# Patient Record
Sex: MALE | Race: WHITE | NOT HISPANIC OR LATINO | Employment: OTHER | URBAN - METROPOLITAN AREA
[De-identification: names, ages, dates, MRNs, and addresses within clinical notes are randomized per-mention and may not be internally consistent; named-entity substitution may affect disease eponyms.]

---

## 2021-05-29 ENCOUNTER — RECORDS - HEALTHEAST (OUTPATIENT)
Dept: ADMINISTRATIVE | Facility: CLINIC | Age: 71
End: 2021-05-29

## 2024-08-30 ENCOUNTER — HOSPITAL ENCOUNTER (EMERGENCY)
Facility: HOSPITAL | Age: 74
Discharge: HOME | End: 2024-08-30
Admitting: PHYSICIAN ASSISTANT
Payer: COMMERCIAL

## 2024-08-30 VITALS
HEART RATE: 78 BPM | DIASTOLIC BLOOD PRESSURE: 72 MMHG | RESPIRATION RATE: 16 BRPM | BODY MASS INDEX: 30.48 KG/M2 | OXYGEN SATURATION: 97 % | SYSTOLIC BLOOD PRESSURE: 118 MMHG | HEIGHT: 73 IN | TEMPERATURE: 99.4 F | WEIGHT: 230 LBS

## 2024-08-30 DIAGNOSIS — M79.10 MYALGIA: ICD-10-CM

## 2024-08-30 DIAGNOSIS — R05.9 COUGH: ICD-10-CM

## 2024-08-30 DIAGNOSIS — U07.1 COVID-19: Primary | ICD-10-CM

## 2024-08-30 PROBLEM — M10.9 GOUT: Status: ACTIVE | Noted: 2024-08-30

## 2024-08-30 PROBLEM — N52.9 IMPOTENCE OF ORGANIC ORIGIN: Status: ACTIVE | Noted: 2024-08-30

## 2024-08-30 PROBLEM — Z86.0101 HISTORY OF ADENOMATOUS POLYP OF COLON: Status: ACTIVE | Noted: 2024-08-30

## 2024-08-30 PROBLEM — E11.9 TYPE 2 DIABETES MELLITUS WITHOUT COMPLICATIONS (H): Status: ACTIVE | Noted: 2024-08-30

## 2024-08-30 PROBLEM — Z77.29 EXPOSURE TO POTENTIALLY HAZARDOUS SUBSTANCE: Status: ACTIVE | Noted: 2024-03-07

## 2024-08-30 PROBLEM — M54.9 CHRONIC BACK PAIN: Status: ACTIVE | Noted: 2024-08-30

## 2024-08-30 PROBLEM — L98.9 DISORDER OF SKIN OR SUBCUTANEOUS TISSUE: Status: ACTIVE | Noted: 2024-08-30

## 2024-08-30 PROBLEM — M47.896 OTHER SPONDYLOSIS, LUMBAR REGION: Status: ACTIVE | Noted: 2024-08-30

## 2024-08-30 PROBLEM — H61.23 IMPACTED CERUMEN, BILATERAL: Status: ACTIVE | Noted: 2024-08-30

## 2024-08-30 PROBLEM — R04.0 EPISTAXIS: Status: ACTIVE | Noted: 2024-08-30

## 2024-08-30 PROBLEM — E11.9 DIABETES MELLITUS (H): Status: ACTIVE | Noted: 2024-08-30

## 2024-08-30 PROBLEM — G47.00 INSOMNIA: Status: ACTIVE | Noted: 2024-08-30

## 2024-08-30 PROBLEM — K03.6 DEPOSITS (ACCRETIONS) ON TEETH: Status: ACTIVE | Noted: 2024-08-30

## 2024-08-30 PROBLEM — I10 HYPERTENSION: Status: ACTIVE | Noted: 2024-08-30

## 2024-08-30 PROBLEM — I35.0 AORTIC VALVE STENOSIS: Status: ACTIVE | Noted: 2024-08-30

## 2024-08-30 PROBLEM — M25.569 PAIN IN JOINT, LOWER LEG: Status: ACTIVE | Noted: 2024-08-30

## 2024-08-30 PROBLEM — M47.816 LUMBAR SPONDYLOSIS: Status: ACTIVE | Noted: 2024-08-30

## 2024-08-30 PROBLEM — G89.29 CHRONIC BACK PAIN: Status: ACTIVE | Noted: 2024-08-30

## 2024-08-30 PROBLEM — K02.7 DENTAL ROOT CARIES: Status: ACTIVE | Noted: 2024-08-30

## 2024-08-30 PROBLEM — K08.424: Status: ACTIVE | Noted: 2024-08-30

## 2024-08-30 PROBLEM — B19.9 VIRAL HEPATITIS WITHOUT HEPATIC COMA: Status: ACTIVE | Noted: 2024-08-30

## 2024-08-30 PROBLEM — R06.00 DYSPNEA, UNSPECIFIED: Status: ACTIVE | Noted: 2024-08-30

## 2024-08-30 LAB
ANION GAP SERPL CALCULATED.3IONS-SCNC: 12 MMOL/L (ref 7–15)
BUN SERPL-MCNC: 16.7 MG/DL (ref 8–23)
CALCIUM SERPL-MCNC: 8.9 MG/DL (ref 8.8–10.4)
CHLORIDE SERPL-SCNC: 98 MMOL/L (ref 98–107)
CREAT SERPL-MCNC: 1.03 MG/DL (ref 0.67–1.17)
EGFRCR SERPLBLD CKD-EPI 2021: 75 ML/MIN/1.73M2
GLUCOSE SERPL-MCNC: 119 MG/DL (ref 70–99)
HCO3 SERPL-SCNC: 27 MMOL/L (ref 22–29)
POTASSIUM SERPL-SCNC: 4.4 MMOL/L (ref 3.4–5.3)
SODIUM SERPL-SCNC: 137 MMOL/L (ref 135–145)

## 2024-08-30 PROCEDURE — 36415 COLL VENOUS BLD VENIPUNCTURE: CPT | Performed by: PHYSICIAN ASSISTANT

## 2024-08-30 PROCEDURE — 80048 BASIC METABOLIC PNL TOTAL CA: CPT | Performed by: PHYSICIAN ASSISTANT

## 2024-08-30 PROCEDURE — 99283 EMERGENCY DEPT VISIT LOW MDM: CPT

## 2024-08-30 ASSESSMENT — ACTIVITIES OF DAILY LIVING (ADL)
ADLS_ACUITY_SCORE: 35
ADLS_ACUITY_SCORE: 33

## 2024-08-30 ASSESSMENT — COLUMBIA-SUICIDE SEVERITY RATING SCALE - C-SSRS
6. HAVE YOU EVER DONE ANYTHING, STARTED TO DO ANYTHING, OR PREPARED TO DO ANYTHING TO END YOUR LIFE?: NO
1. IN THE PAST MONTH, HAVE YOU WISHED YOU WERE DEAD OR WISHED YOU COULD GO TO SLEEP AND NOT WAKE UP?: NO
2. HAVE YOU ACTUALLY HAD ANY THOUGHTS OF KILLING YOURSELF IN THE PAST MONTH?: NO

## 2024-08-30 NOTE — ED TRIAGE NOTES
Pt states that he has been ill with cough, body aches, shortness of breath, and headaches for the last few days. Pt tested positive for COVID today. [facility] and [facility] told [facility] to be checked out in the ER today due to the body aches.      Triage Assessment (Adult)       Row Name 08/30/24 1258          Triage Assessment    Airway WDL WDL        Respiratory WDL    Respiratory WDL cough;rhythm/pattern     Rhythm/Pattern, Respiratory shortness of breath;unlabored     Cough Frequency frequent     Cough Type productive        Skin Circulation/Temperature WDL    Skin Circulation/Temperature WDL WDL        Cardiac WDL    Cardiac WDL WDL        Peripheral/Neurovascular WDL    Peripheral Neurovascular WDL WDL        Cognitive/Neuro/Behavioral WDL    Cognitive/Neuro/Behavioral WDL WDL

## 2024-08-30 NOTE — ED PROVIDER NOTES
EMERGENCY DEPARTMENT ENCOUNTER      NAME: Darin Bergman  AGE: 74 year old male  YOB: 1950  MRN: [id number]  EVALUATION DATE & TIME: 2024  2:00 PM    PCP: [facility]    ED PROVIDER: Fidelina Nagel PA-C      Chief Complaint   Patient presents with    Covid Concern         FINAL IMPRESSION:  1. COVID-19    2. Myalgia    3. Cough          ED COURSE & MEDICAL DECISION MAKIN:58 PM I introduced myself to patient, performed initial HPI and examination. Consulted with pharmacy regarding paxlovid candidacy.   2:58 PM Consulted with pharmacy regarding paxlovid candidacy.   4:36 PM BMP resulted, will discharge with paxlovid     74 year old male with PMH chronic back pain, type 2 diabetes, HTN, viral hepatitis presents to the Emergency Department for evaluation of COVID. Reports symptoms started 3 days ago. Positive COVID test at home today. Here for paxlovid.    Reports productive cough. VSS, afebrile without hypoxia.  Exam with well appearing male, no acute distress. Clear lungs.  Considered CXR/CT chest but with clear lungs, reassuring vitals and + covid testing at home this was deferred. Low suspicion for bacterial pneumonia. Not consistent with ACS, no indication for EKG/additional labs.     Presentation most consistent with viral syndrome, COVID with positive test at home. Consulted with pharmacy who gives the below recommendations:   AMLODIPINE 5 mg: Reduce 2.5 mg daily for next 8 days  PERCOCET 2 tablets q8h PRN: Reduce to 5 mg q8h PRN and monitor closely for increased side effects  LIPITOR 20 mg: HOLD for 8 days  TAMSULOSIN 0.4 mg BID: HOLD for 8 days    Instructions and prescription for paxlovid provided. Instructed on at home management, red flags/indications to return to the emergency department. All questions were answered to the best of my ability and patient is agreeable with plan.      Medical Decision Making    History:  Supplemental history from: Documented in  chart  External Record(s) reviewed: Documented in chart    Work Up:  Chart documentation includes differential considered and any EKGs or imaging independently interpreted by provider.  In additional to work up documented, I considered the following work up: Documented in chart, if applicable.    External consultation:  Discussion of management with another provider: Documented in chart, if applicable    Complicating factors:  Care impacted by chronic illness: Other: See above  Care affected by social determinants of health: N/A    Disposition considerations: Discharge. I prescribed additional prescription strength medication(s) as charted. See documentation for any additional details.      MEDICATIONS GIVEN IN THE EMERGENCY:  Medications - No data to display    NEW PRESCRIPTIONS STARTED AT TODAY'S ER VISIT  New Prescriptions    NIRMATRELVIR AND RITONAVIR (PAXLOVID) 300 MG/100 MG THERAPY PACK    Take 3 tablets by mouth 2 times daily for 5 days.          =================================================================    HPI    Patient information was obtained from: Patient    Use of : N/A         Darin Bergman is a 74 year old male with a pertinent history of chronic back pain, type 2 diabetes, HTN, viral hepatitis who presents to this ED by private car for evaluation of myalgias, productive cough, shortness of breath. Patient's wife tested positive for COVID last week,improving. Patient developed symptoms x 3 days ago, positive test today. Presents today for paxlovid treatment.    On Metformin for diabetes. Atorvastatin for cholesterol. No blood thinners. Does intermittently take prednisone, not taking any currently.    Does have a history of lung disease, questions COPD. Prior smoking history (quit many years ago). Uses albuterol inhaler as needed, has not needed it this past week.       REVIEW OF SYSTEMS   ROS negative unless otherwise stated in HPI    PAST MEDICAL HISTORY:  No past medical history  "on file.    PAST SURGICAL HISTORY:  No past surgical history on file.    CURRENT MEDICATIONS:    nirmatrelvir and ritonavir (PAXLOVID) 300 mg/100 mg therapy pack        ALLERGIES:  No Known Allergies    FAMILY HISTORY:  No family history on file.    SOCIAL HISTORY:   Social History     Socioeconomic History    Marital status:        VITALS:  /68   Pulse 80   Temp 99.4  F (37.4  C) (Oral)   Resp 21   Ht 1.854 m (6' 1\")   Wt 104.3 kg (230 lb)   SpO2 96%   BMI 30.34 kg/m      PHYSICAL EXAM    Constitutional: Well developed, Well nourished, NAD, GCS 15   HENT: Normocephalic, Atraumatic, Oropharynx clear.   Neck- Supple, Nontender. Normal ROM. No stridor.  Eyes: Conjunctiva normal.   Respiratory: No respiratory distress, speaking in full sentences. Normal breath sounds. No wheezing.   Cardiovascular: Normal heart rate, Regular rhythm, No murmurs.   GI: Soft, nontender  Musculoskeletal: No deformities, Moves all extremities equally. No calf tenderness or swelling.  Integument: Warm, Dry, No erythema, ecchymosis, or rash.  Neurologic: Alert & oriented x 3, Normal sensory function. No focal deficits.   Psychiatric: Affect normal, Judgment normal, Mood normal. Cooperative.     LAB:  All pertinent labs reviewed and interpreted.  Results for orders placed or performed during the hospital encounter of 08/30/24   Basic metabolic panel   Result Value Ref Range    Sodium 137 135 - 145 mmol/L    Potassium 4.4 3.4 - 5.3 mmol/L    Chloride 98 98 - 107 mmol/L    Carbon Dioxide (CO2) 27 22 - 29 mmol/L    Anion Gap 12 7 - 15 mmol/L    Urea Nitrogen 16.7 8.0 - 23.0 mg/dL    Creatinine 1.03 0.67 - 1.17 mg/dL    GFR Estimate 75 >60 mL/min/1.73m2    Calcium 8.9 8.8 - 10.4 mg/dL    Glucose 119 (H) 70 - 99 mg/dL       RADIOLOGY:  Reviewed all pertinent imaging. Please see official radiology report.  No orders to display       EKG:    None    PROCEDURES:   none        Fidelina Nagel PA-C  Emergency Medicine  [facility] " [facility] EMERGENCY DEPARTMENT  [address] [place] [id number]  [phone]             Fidelina Nagel PA-C  08/30/24 4564

## 2024-08-30 NOTE — DISCHARGE INSTRUCTIONS
AMLODIPINE 5 mg: Reduce 2.5 mg daily for next 8 days  PERCOCET 2 tablets q8h PRN: Reduce to 5 mg q8h PRN and monitor closely for increased side effects  LIPITOR 20 mg: HOLD for 8 days  TAMSULOSIN 0.4 mg BID: HOLD for 8 days    Take paxlovid as prescribed  Make sure you are drinking fluids to stay hydrated  Use your inhaler as needed    Return to the emergency department if you develop any new/worsening symptoms. We would be happy to see you.

## 2024-08-30 NOTE — ED NOTES
"Pt reports body aches , cough x 3 days. His wife tested covid positive last week. He has not been taking any OTC for pain. Requesting RX fir paxlovid. He did call [facility] but was told staff \" everyone's out of town and unable to give rx\"  "

## 2024-09-13 ENCOUNTER — HOSPITAL ENCOUNTER (EMERGENCY)
Facility: HOSPITAL | Age: 74
Discharge: HOME | End: 2024-09-13
Attending: STUDENT IN AN ORGANIZED HEALTH CARE EDUCATION/TRAINING PROGRAM | Admitting: STUDENT IN AN ORGANIZED HEALTH CARE EDUCATION/TRAINING PROGRAM
Payer: MEDICARE

## 2024-09-13 VITALS
BODY MASS INDEX: 29.52 KG/M2 | OXYGEN SATURATION: 92 % | TEMPERATURE: 97.9 F | RESPIRATION RATE: 22 BRPM | DIASTOLIC BLOOD PRESSURE: 60 MMHG | SYSTOLIC BLOOD PRESSURE: 127 MMHG | HEIGHT: 74 IN | HEART RATE: 80 BPM | WEIGHT: 230 LBS

## 2024-09-13 DIAGNOSIS — T78.40XA ALLERGIC REACTION, INITIAL ENCOUNTER: ICD-10-CM

## 2024-09-13 PROCEDURE — 96372 THER/PROPH/DIAG INJ SC/IM: CPT | Performed by: STUDENT IN AN ORGANIZED HEALTH CARE EDUCATION/TRAINING PROGRAM

## 2024-09-13 PROCEDURE — 96374 THER/PROPH/DIAG INJ IV PUSH: CPT

## 2024-09-13 PROCEDURE — 96375 TX/PRO/DX INJ NEW DRUG ADDON: CPT

## 2024-09-13 PROCEDURE — 250N000011 HC RX IP 250 OP 636: Performed by: STUDENT IN AN ORGANIZED HEALTH CARE EDUCATION/TRAINING PROGRAM

## 2024-09-13 PROCEDURE — 99285 EMERGENCY DEPT VISIT HI MDM: CPT | Mod: 25

## 2024-09-13 RX ORDER — METHYLPREDNISOLONE SODIUM SUCCINATE 125 MG/2ML
125 INJECTION, POWDER, LYOPHILIZED, FOR SOLUTION INTRAMUSCULAR; INTRAVENOUS ONCE
Status: COMPLETED | OUTPATIENT
Start: 2024-09-13 | End: 2024-09-13

## 2024-09-13 RX ORDER — EPINEPHRINE 1 MG/ML
0.3 INJECTION, SOLUTION INTRAMUSCULAR; SUBCUTANEOUS ONCE
Status: COMPLETED | OUTPATIENT
Start: 2024-09-13 | End: 2024-09-13

## 2024-09-13 RX ORDER — EPINEPHRINE 0.3 MG/.3ML
0.3 INJECTION SUBCUTANEOUS
Qty: 2 EACH | Refills: 0 | Status: SHIPPED | OUTPATIENT
Start: 2024-09-13

## 2024-09-13 RX ORDER — DIPHENHYDRAMINE HYDROCHLORIDE 50 MG/ML
25 INJECTION INTRAMUSCULAR; INTRAVENOUS ONCE
Status: COMPLETED | OUTPATIENT
Start: 2024-09-13 | End: 2024-09-13

## 2024-09-13 RX ORDER — LIDOCAINE 40 MG/G
CREAM TOPICAL
Status: DISCONTINUED | OUTPATIENT
Start: 2024-09-13 | End: 2024-09-13 | Stop reason: HOSPADM

## 2024-09-13 RX ADMIN — FAMOTIDINE 20 MG: 10 INJECTION, SOLUTION INTRAVENOUS at 13:19

## 2024-09-13 RX ADMIN — DIPHENHYDRAMINE HYDROCHLORIDE 25 MG: 50 INJECTION INTRAMUSCULAR; INTRAVENOUS at 13:15

## 2024-09-13 RX ADMIN — METHYLPREDNISOLONE SODIUM SUCCINATE 125 MG: 125 INJECTION, POWDER, FOR SOLUTION INTRAMUSCULAR; INTRAVENOUS at 13:17

## 2024-09-13 RX ADMIN — EPINEPHRINE 0.3 MG: 1 INJECTION INTRAMUSCULAR; INTRAVENOUS; SUBCUTANEOUS at 13:12

## 2024-09-13 ASSESSMENT — ACTIVITIES OF DAILY LIVING (ADL)
ADLS_ACUITY_SCORE: 35
ADLS_ACUITY_SCORE: 33
ADLS_ACUITY_SCORE: 35

## 2024-09-13 NOTE — ED TRIAGE NOTES
Pt got a bee sting on neck arms and legs today aprox 1 hr ago. Took 2 benadryl post stings. Pt reports that he went to ED aprox 3 years ago due to same reaction. That time his face and throat swelled. Able to maintain airway. No sob. No visible hives. Wife at bedside.

## 2024-09-13 NOTE — DISCHARGE INSTRUCTIONS
Please take benadryl or claritin at home for the next couple days.  Use the epipen if you get bit bees again.  Return to the ER if you ever use the epipen

## 2024-09-13 NOTE — ED PROVIDER NOTES
"EMERGENCY DEPARTMENT ENCOUNTER      NAME: Darin Bergman  AGE: 74 year old male  YOB: 1950  MRN: [id number]  EVALUATION DATE & TIME: 9/13/2024  1:02 PM    PCP: [facility]    ED PROVIDER: Orion Avendaño M.D.      Chief Complaint   Patient presents with    Allergic Reaction         FINAL IMPRESSION:  1. Allergic reaction, initial encounter          ED COURSE & MEDICAL DECISION MAKING:    Pertinent Labs & Imaging studies reviewed. (See chart for details)  74 year old male presents to the Emergency Department for evaluation of allergic reaction.    1:08 PM I met with the patient and conducted the initial exam and interview.  He is having a reaction to bee stings.  He relays preveious severe reaction and for that reasonwill proceed with IV antihistamines, steroids and IM epi.    2:48 PM I revisited and updated patient. Patient said he was feeling good but still itchy. We discussed discharge instructions, ointments for itching, and Epipen prescription and usage.    At the conclusion of the encounter I discussed the results of all of the tests and the discharge. The questions were answered. The patient or family acknowledged understanding and was agreeable with the care plan.              Medical Decision Making    Patient with history of severe swelling to bee sting.  He feels his throat is \"closing\".  Looks good on exam.  No posterior swelling.  Vitals reassurring.  Treated with IV meds and epipen because of the history and fact that they said it was \"delayed\" last time.  Patient did well with medications.  Was observed in the ER for 2.5 hours and is feeling better.  Will discharge with insturctions on epipen use and a rx for epipen.  Considered admission for observation but patient improved signficantly and so will discharge.    Obtained supplemental history:Supplemental history obtained?: Family Member/Significant Other  Reviewed external records: External records reviewed?: No  Care impacted " by chronic illness:Diabetes and Hypertension  Care significantly affected by social determinants of health:N/A  Did you consider but not order tests?: Work up considered but not performed and documented in chart, if applicable  Did you interpret images independently?: Independent interpretation of ECG and images noted in documentation, when applicable.  Consultation discussion with other provider:Did you involve another provider (consultant, , pharmacy, etc.)?: No  Discharge. I prescribed additional prescription strength medication(s) as charted. See documentation for any additional details.  Not Applicable          This patient involved a high degree of complexity in medical decision making, as significant risks were present and assessed. Recent encounters & results in medical record reviewed by me.     All workup (i.e. any EKG/labs/imaging as per charting below) reviewed and independently interpreted by me. See respective sections for details.      30 minutes of critical care time     MEDICATIONS GIVEN IN THE EMERGENCY:  Medications   EPINEPHrine (Anaphylaxis) (ADRENALIN) injection (vial) 0.3 mg (0.3 mg Intramuscular $Given 9/13/24 1312)   famotidine (PEPCID) injection 20 mg (20 mg Intravenous $Given 9/13/24 1319)   methylPREDNISolone Na Suc (solu-MEDROL) injection 125 mg (125 mg Intravenous $Given 9/13/24 1317)   diphenhydrAMINE (BENADRYL) injection 25 mg (25 mg Intravenous $Given 9/13/24 1315)       NEW PRESCRIPTIONS STARTED AT TODAY'S ER VISIT  Discharge Medication List as of 9/13/2024  3:16 PM        START taking these medications    Details   EPINEPHrine (ANY BX GENERIC EQUIV) 0.3 MG/0.3ML injection 2-pack Inject 0.3 mLs (0.3 mg) into the muscle once as needed for anaphylaxis. May repeat one time in 5-15 minutes if response to initial dose is inadequate., Disp-2 each, R-0, Local Print                =================================================================    HPI    Patient information was obtained  "from: patient and wife    Use of : N/A        Darin Bergman is a 74 year old male with a pertinent history of type 2 diabetes and hypertension who presents for evaluation of allergic reaction.    About one hour ago, patient received multiple bee stings by multiple bees on his legs. Patient took 2 benadryl immediately after. He had itchiness and neck tightness that felt like his throat was \"closing up\". The itchiness has decreased and the throat is slightly better.     About 3 years ago, patient had a bee sting and about 3 hours after, he had swelling all over his body and had to come into the ED. Patient denies history of heart problems.      REVIEW OF SYSTEMS   Review of Systems   Constitutional:         Positive for neck tightness   Neurological:         Positive for itchiness        PAST MEDICAL HISTORY:  No past medical history on file.    PAST SURGICAL HISTORY:  No past surgical history on file.        CURRENT MEDICATIONS:    EPINEPHrine (ANY BX GENERIC EQUIV) 0.3 MG/0.3ML injection 2-pack        ALLERGIES:  No Known Allergies    FAMILY HISTORY:  No family history on file.    SOCIAL HISTORY:   Social History     Socioeconomic History    Marital status:        VITALS:  /60   Pulse 80   Temp 97.9  F (36.6  C) (Oral)   Resp 22   Ht 1.88 m (6' 2\")   Wt 104.3 kg (230 lb)   SpO2 92%   BMI 29.53 kg/m        PHYSICAL EXAM    VITAL SIGNS: /60   Pulse 80   Temp 97.9  F (36.6  C) (Oral)   Resp 22   Ht 1.88 m (6' 2\")   Wt 104.3 kg (230 lb)   SpO2 92%   BMI 29.53 kg/m    Constitutional:  Well developed, well nourished   EYES: Conjunctivae clear, no discharge  HENT: Atraumatic, normocephalic, bilateral external ears normal. Normal posterior pharynx with no swelling. Oropharynx moist. Nose normal.   Neck: Normal ROM , Supple   Respiratory:  No respiratory distress, normal nonlabored respirations.   Cardiovascular:  Distal perfusion appears intact  Musculoskeletal:  Multiple bee " stings to lower extremities, No swelling to extremities, No cyanosis, No clubbing. Good range of motion in all major joints.   Integument:  Warm, Dry, No erythema, No rash.   Neurologic:  Alert and oriented. No focal deficits noted.  Ambulatory  Psychiatric:  Affect normal     LAB:  All pertinent labs reviewed and interpreted.  Labs Ordered and Resulted from Time of ED Arrival to Time of ED Departure - No data to display    RADIOLOGY:  Reviewed all pertinent imaging. Please see official radiology report.  No orders to display           IMonica, am serving as a scribe to document services personally performed by Dr. Orion Avendaño based on my observation and the provider's statements to me. Orion NARVAEZ MD attest that Monica Berkowitz is acting in a scribe capacity, has observed my performance of the services and has documented them in accordance with my direction.    Orion Avendaño M.D.  Emergency Medicine  [facility] EMERGENCY DEPARTMENT  [facility] [facility] [id number] [phone]  Dept: [phone]       Orion Avendaño MD  09/19/24 [id number]

## 2024-09-13 NOTE — ED NOTES
Pt here after getting stung multiple bees while trimming a raspberry bush. Pt 's breathing is clear, no sob. Does not have a tight throat. All meds given by waiting room RN's. Pt on monitor with multiple PVC's